# Patient Record
(demographics unavailable — no encounter records)

---

## 2025-07-17 NOTE — ASSESSMENT
[FreeTextEntry1] : The patient has significant risk factors for CAD including DM , family history of CAD ( Not premature) HTN and increased cholesterol . He che not have chest pain or SOB . He does have symptoms of JOSELYN however and this needs to be evaluated for .

## 2025-07-17 NOTE — REASON FOR VISIT
[CV Risk Factors and Non-Cardiac Disease] : CV risk factors and non-cardiac disease [Hyperlipidemia] : hyperlipidemia [Hypertension] : hypertension [FreeTextEntry3] : Dr. Childers

## 2025-07-17 NOTE — REVIEW OF SYSTEMS
[Feeling Fatigued] : feeling fatigued [Anxiety] : anxiety [Negative] : Neurological [FreeTextEntry2] : jyoti

## 2025-07-17 NOTE — HISTORY OF PRESENT ILLNESS
[FreeTextEntry1] : The patient has DM HTN and increased cholesterol and risk factors for early CAD . The patient has had no chest pain or SOB and is here for cardiac evaluation